# Patient Record
Sex: MALE | Race: WHITE
[De-identification: names, ages, dates, MRNs, and addresses within clinical notes are randomized per-mention and may not be internally consistent; named-entity substitution may affect disease eponyms.]

---

## 2019-09-03 ENCOUNTER — HOSPITAL ENCOUNTER (EMERGENCY)
Dept: HOSPITAL 74 - JER | Age: 29
LOS: 1 days | Discharge: HOME | End: 2019-09-04
Payer: SELF-PAY

## 2019-09-03 VITALS — BODY MASS INDEX: 26.2 KG/M2

## 2019-09-03 VITALS — TEMPERATURE: 98.8 F

## 2019-09-03 DIAGNOSIS — F17.210: ICD-10-CM

## 2019-09-03 DIAGNOSIS — R10.9: Primary | ICD-10-CM

## 2019-09-03 LAB
ALBUMIN SERPL-MCNC: 4.7 G/DL (ref 3.4–5)
ALP SERPL-CCNC: 80 U/L (ref 45–117)
ALT SERPL-CCNC: 35 U/L (ref 13–61)
ANION GAP SERPL CALC-SCNC: 7 MMOL/L (ref 8–16)
APPEARANCE UR: CLEAR
AST SERPL-CCNC: 21 U/L (ref 15–37)
BACTERIA # UR AUTO: 4.1 /HPF
BASOPHILS # BLD: 0.5 % (ref 0–2)
BILIRUB SERPL-MCNC: 0.6 MG/DL (ref 0.2–1)
BILIRUB UR STRIP.AUTO-MCNC: NEGATIVE MG/DL
BUN SERPL-MCNC: 8.6 MG/DL (ref 7–18)
CALCIUM SERPL-MCNC: 9.5 MG/DL (ref 8.5–10.1)
CASTS URNS QL MICRO: 41 /LPF (ref 0–8)
CHLORIDE SERPL-SCNC: 101 MMOL/L (ref 98–107)
CO2 SERPL-SCNC: 29 MMOL/L (ref 21–32)
COLOR UR: YELLOW
CREAT SERPL-MCNC: 0.9 MG/DL (ref 0.55–1.3)
DEPRECATED RDW RBC AUTO: 13.3 % (ref 11.9–15.9)
EOSINOPHIL # BLD: 0 % (ref 0–4.5)
EPITH CASTS URNS QL MICRO: 5.7 /HPF
GLUCOSE SERPL-MCNC: 134 MG/DL (ref 74–106)
HCT VFR BLD CALC: 51.5 % (ref 35.4–49)
HGB BLD-MCNC: 17.6 GM/DL (ref 11.7–16.9)
INR BLD: 1.01 (ref 0.83–1.09)
KETONES UR QL STRIP: (no result)
LEUKOCYTE ESTERASE UR QL STRIP.AUTO: (no result)
LIPASE SERPL-CCNC: 114 U/L (ref 73–393)
LYMPHOCYTES # BLD: 11.1 % (ref 8–40)
MAGNESIUM SERPL-MCNC: 2.3 MG/DL (ref 1.8–2.4)
MCH RBC QN AUTO: 31 PG (ref 25.7–33.7)
MCHC RBC AUTO-ENTMCNC: 34.3 G/DL (ref 32–35.9)
MCV RBC: 90.6 FL (ref 80–96)
MONOCYTES # BLD AUTO: 6 % (ref 3.8–10.2)
NEUTROPHILS # BLD: 82.4 % (ref 42.8–82.8)
NITRITE UR QL STRIP: NEGATIVE
PH UR: 6 [PH] (ref 5–8)
PHOSPHATE SERPL-MCNC: 2.6 MG/DL (ref 2.5–4.9)
PLATELET # BLD AUTO: 286 K/MM3 (ref 134–434)
PMV BLD: 9 FL (ref 7.5–11.1)
POTASSIUM SERPLBLD-SCNC: 3.7 MMOL/L (ref 3.5–5.1)
PROT SERPL-MCNC: 8.2 G/DL (ref 6.4–8.2)
PROT UR QL STRIP: (no result)
PROT UR QL STRIP: NEGATIVE
PT PNL PPP: 11.9 SEC (ref 9.7–13)
RBC # BLD AUTO: 3 /HPF (ref 0–4)
RBC # BLD AUTO: 5.68 M/MM3 (ref 4–5.6)
SODIUM SERPL-SCNC: 138 MMOL/L (ref 136–145)
SP GR UR: 1.03 (ref 1.01–1.03)
UROBILINOGEN UR STRIP-MCNC: 0.2 MG/DL (ref 0.2–1)
WBC # BLD AUTO: 16.4 K/MM3 (ref 4–10)
WBC # UR AUTO: 14 /HPF (ref 0–5)

## 2019-09-03 PROCEDURE — 3E033NZ INTRODUCTION OF ANALGESICS, HYPNOTICS, SEDATIVES INTO PERIPHERAL VEIN, PERCUTANEOUS APPROACH: ICD-10-PCS

## 2019-09-03 PROCEDURE — 3E0337Z INTRODUCTION OF ELECTROLYTIC AND WATER BALANCE SUBSTANCE INTO PERIPHERAL VEIN, PERCUTANEOUS APPROACH: ICD-10-PCS

## 2019-09-03 PROCEDURE — 3E033GC INTRODUCTION OF OTHER THERAPEUTIC SUBSTANCE INTO PERIPHERAL VEIN, PERCUTANEOUS APPROACH: ICD-10-PCS

## 2019-09-03 NOTE — PDOC
Rapid Medical Evaluation


Time Seen by Provider: 09/03/19 18:38


Medical Evaluation: 





09/03/19 18:38


I have performed a brief in-person evaluation of this patient.





The patient presents with a chief complaint of: 3 days of NBNB intractable 

vomiting with periumbilical abdominal pain, no diarrhea, no urinary symptoms, 

tactile fever, Unable to eat.





I have ordered the following: CBC, CMP, Type and screen, PT'PTT, UA, Ucx





The patient will proceed to the ED for further evaluation.





**Discharge Disposition





- Diagnosis


Abdominal pain


Qualifiers:


 Abdominal location: unspecified location Qualified Code(s): R10.9 - 

Unspecified abdominal pain








- Referrals





- Patient Instructions





- Post Discharge Activity

## 2019-09-03 NOTE — PDOC
Attending Attestation





- Resident


Resident Name: Lydia Simpson





- ED Attending Attestation


I have performed the following: I have examined & evaluated the patient, The 

case was reviewed & discussed with the resident, I agree w/resident's findings 

& plan, Exceptions are as noted





- HPI


HPI: 





09/03/19 23:41


30 yo male presents with nausea and vomiting and complained of diffuse 

abdominal pain





- Physicial Exam


PE: 





09/03/19 23:41


wnwd 30 yo male


head ncat


neck supple


lungs cta b/l


cvs [id number]


abd diffuse  tenderness to palpation,no rebound


skin warm and dry


ext no edema


neuro axox3,ambulatory





- Medical Decision Making





09/03/19 23:43


review of labs shows leukocytosis


c scan of abd/pel: no appendicitis,no sbo,no cholecystitis,no colitis





imp  gastritis


plan  if pt symptoms resolve,will d/c with zofran

## 2019-09-03 NOTE — PDOC
History of Present Illness





- General


Chief Complaint: Pain, Acute


Stated Complaint: ABDOMINAL PAIN


Time Seen by Provider: 09/03/19 18:38





Past History





- Past Medical History


Allergies/Adverse Reactions: 


 Allergies











Allergy/AdvReac Type Severity Reaction Status Date / Time


 


No Known Allergies Allergy   Verified 09/03/19 18:40











Home Medications: 


Ambulatory Orders





Ondansetron [Zofran Odt -] 4 mg SL TID #10 od.tablet 09/04/19 








COPD: No





- Suicide/Smoking/Psychosocial Hx


Smoking History: Current every day smoker


Number of Cigarettes Smoked Daily: 1


Information on smoking cessation initiated: No


Hx Alcohol Use: No


Drug/Substance Use Hx: Yes (CANNABIS)





*Physical Exam





- Vital Signs


 Last Vital Signs











Temp Pulse Resp BP Pulse Ox


 


 98.8 F   80   16   148/98   99 


 


 09/03/19 18:36  09/03/19 18:36  09/03/19 18:36  09/03/19 18:36  09/03/19 18:36














ED Treatment Course





- LABORATORY


CBC & Chemistry Diagram: 


 09/03/19 19:04





 09/03/19 19:04





- ADDITIONAL ORDERS


Additional order review: 


 Laboratory  Results











  09/03/19 09/03/19 09/03/19





  19:04 19:04 19:04


 


PT with INR    11.90


 


INR    1.01


 


Sodium   


 


Potassium   


 


Chloride   


 


Carbon Dioxide   


 


Anion Gap   


 


BUN   


 


Creatinine   


 


Est GFR (CKD-EPI)AfAm   


 


Est GFR (CKD-EPI)NonAf   


 


Random Glucose   


 


Calcium   


 


Phosphorus   


 


Magnesium   


 


Total Bilirubin   


 


AST   


 


ALT   


 


Alkaline Phosphatase   


 


Total Protein   


 


Albumin   


 


Lipase   


 


Urine Color  Yellow  


 


Urine Appearance  Clear  


 


Urine pH  6.0  


 


Ur Specific Gravity  1.026  


 


Urine Protein  1+ H  


 


Urine Glucose (UA)  Negative  


 


Urine Ketones  1+ H  


 


Urine Blood  Negative  


 


Urine Nitrite  Negative  


 


Urine Bilirubin  Negative  


 


Urine Urobilinogen  0.2  


 


Ur Leukocyte Esterase  Trace  


 


Urine WBC (Auto)  14  


 


Urine RBC (Auto)  3  


 


Urine Casts (Auto)  41  


 


U Pathogenic Cast Auto  None  


 


U Epithel Cells (Auto)  5.7  


 


U Sm Round Cell (Auto)  None  


 


Urine Bacteria (Auto)  4.1  


 


Blood Type   O POSITIVE 


 


Antibody Screen   Negative 














  09/03/19





  19:04


 


PT with INR 


 


INR 


 


Sodium  138


 


Potassium  3.7


 


Chloride  101


 


Carbon Dioxide  29


 


Anion Gap  7 L


 


BUN  8.6


 


Creatinine  0.9


 


Est GFR (CKD-EPI)AfAm  134.24


 


Est GFR (CKD-EPI)NonAf  115.82


 


Random Glucose  134 H


 


Calcium  9.5


 


Phosphorus  2.6


 


Magnesium  2.3


 


Total Bilirubin  0.6


 


AST  21


 


ALT  35


 


Alkaline Phosphatase  80


 


Total Protein  8.2


 


Albumin  4.7


 


Lipase  114


 


Urine Color 


 


Urine Appearance 


 


Urine pH 


 


Ur Specific Gravity 


 


Urine Protein 


 


Urine Glucose (UA) 


 


Urine Ketones 


 


Urine Blood 


 


Urine Nitrite 


 


Urine Bilirubin 


 


Urine Urobilinogen 


 


Ur Leukocyte Esterase 


 


Urine WBC (Auto) 


 


Urine RBC (Auto) 


 


Urine Casts (Auto) 


 


U Pathogenic Cast Auto 


 


U Epithel Cells (Auto) 


 


U Sm Round Cell (Auto) 


 


Urine Bacteria (Auto) 


 


Blood Type 


 


Antibody Screen 








 











  09/03/19





  19:04


 


RBC  5.68 H


 


MCV  90.6


 


MCHC  34.3


 


RDW  13.3


 


MPV  9.0


 


Neutrophils %  82.4


 


Lymphocytes %  11.1


 


Monocytes %  6.0


 


Eosinophils %  0.0


 


Basophils %  0.5














Medical Decision Making





- Medical Decision Making


HPI: 


28yo M with no significant PMH presenting with abdominal, nausea, and vomiting. 

Patient states he has felt this pain before; was evaluated two years ago and 

was found to have a urine infection. Since Saturday, patient has had 

periumbilical abdominal pain that is described as "emptiness." He reports that 

his pain worsens with eating. patient has been unable to tolerate po intake. He 

has had bilious vomiting 8-10 times today. Last bowel movement was a half hour 

before history-taking and was a normaly formed brown stool without blood. 

Denies urinary symptoms. No sick contacts or recent travel. Denies ETOH or 

tobacco use but has been a daily marijauna smoker, about one joint per day for 

the past five years. Reports subjective fevers, chills, headache, and weakness. 

No chest pain or shortness of breath. 





PCP: None





History obtained with assistance from [facility] : [id number]





ROS:


Constitutional: +fever, +chills


HEENT: no throat pain, no dysphagia


Cardiovascular: no chest pain, no palpitations


Respiratory: no cough, no shortness of breath


Gastrointestinal: +abdominal pain, +vomiting


Genitourinary: no dysuria, no hematuria


Musculoskeletal: no myalgia, no arthralgia


Skin: no rash, no itching


Neurologic: no headache, no weakness





PE: 


General: Awake, alert, and fully oriented, in no acute distress


Head: No signs of trauma


Eyes: EOMI, sclera anicteric


ENT: Dry mucus membranes


Neck: Normal ROM, supple


Lungs: Lungs clear, Normal breath sounds


Cardio: Regular rhythm, S1 and S2 present


Abdomen: Diffuse tenderness to palpation most focal to periumbilical region. No 

guarding, no rebound, no masses. No CVA tenderness


Extremities: Normal range of motion, Distal pulses present


SKIN: Warm, Dry, normal turgor


Neurologic: Cranial nerves II through XII grossly intact. Normal speech





ED Course/MDM: 


DDX including but not limited to appendicitis, gastritis, GERD, UTI, 

pyelonephritis, pancreatitis, cannabis hyperemesis


09/03/19 21:09





 CBC











WBC  16.4 K/mm3 (4.0-10.0)  H  09/03/19  19:04    


 


RBC  5.68 M/mm3 (4.00-5.60)  H  09/03/19  19:04    


 


Hgb  17.6 GM/dL (11.7-16.9)  H  09/03/19  19:04    


 


Hct  51.5 % (35.4-49)  H  09/03/19  19:04    


 


MCV  90.6 fl (80-96)   09/03/19  19:04    


 


MCH  31.0 pg (25.7-33.7)   09/03/19  19:04    


 


MCHC  34.3 g/dl (32.0-35.9)   09/03/19  19:04    


 


RDW  13.3 % (11.9-15.9)   09/03/19  19:04    


 


Plt Count  286 K/MM3 (134-434)   09/03/19  19:04    


 


MPV  9.0 fl (7.5-11.1)   09/03/19  19:04    


 


Absolute Neuts (auto)  13.5 K/mm3 (1.5-8.0)  H  09/03/19  19:04    


 


Neutrophils %  82.4 % (42.8-82.8)   09/03/19  19:04    


 


Lymphocytes %  11.1 % (8-40)   09/03/19  19:04    


 


Monocytes %  6.0 % (3.8-10.2)   09/03/19  19:04    


 


Eosinophils %  0.0 % (0-4.5)   09/03/19  19:04    


 


Basophils %  0.5 % (0-2.0)   09/03/19  19:04    


 


Nucleated RBC %  0 % (0-0)   09/03/19  19:04    








Leukocytosis, WBC= 16.4





 CMP











Sodium  138 mmol/L (136-145)   09/03/19  19:04    


 


Potassium  3.7 mmol/L (3.5-5.1)   09/03/19  19:04    


 


Chloride  101 mmol/L ()   09/03/19  19:04    


 


Carbon Dioxide  29 mmol/L (21-32)   09/03/19  19:04    


 


Anion Gap  7 MMOL/L (8-16)  L  09/03/19  19:04    


 


BUN  8.6 mg/dL (7-18)   09/03/19  19:04    


 


Creatinine  0.9 mg/dL (0.55-1.3)   09/03/19  19:04    


 


Est GFR (CKD-EPI)AfAm  134.24   09/03/19  19:04    


 


Est GFR (CKD-EPI)NonAf  115.82   09/03/19  19:04    


 


Random Glucose  134 mg/dL ()  H  09/03/19  19:04    


 


Calcium  9.5 mg/dL (8.5-10.1)   09/03/19  19:04    


 


Phosphorus  2.6 mg/dL (2.5-4.9)   09/03/19  19:04    


 


Magnesium  2.3 mg/dL (1.8-2.4)   09/03/19  19:04    


 


Total Bilirubin  0.6 mg/dL (0.2-1)   09/03/19  19:04    


 


AST  21 U/L (15-37)   09/03/19  19:04    


 


ALT  35 U/L (13-61)   09/03/19  19:04    


 


Alkaline Phosphatase  80 U/L ()   09/03/19  19:04    


 


Total Protein  8.2 g/dl (6.4-8.2)   09/03/19  19:04    


 


Albumin  4.7 g/dl (3.4-5.0)   09/03/19  19:04    


 


Lipase  114 U/L ()   09/03/19  19:04    








Electrolytes unremarkable


Cr normal


Lipase normal





CT neg for acute pathology, per radiology report. 


Tolerated po challenge of crackers and water


09/04/19 00:11





Sent prescription for zofran


Discharged with return precautions





CTAP with IV contrast, per radiology: "Sequential axial images were obtained 

from the domes of the diaphragms through the symphysis pubis following the 

administration of intravenous contrast material. The lung bases are clear of 

acute infiltrates or pleural effusions. Mild atelectatic changes are seen 

anteriorly. The liver, spleen, pancreas, adrenal glands and kidneys demonstrate 

no significant abnormalities. The gallbladder is clear. There is no evidence of 

intra-abdominal or retroperitoneal lymphadenopathy or fluid collections. There 

is no evidence of pneumoperitoneum, bowel obstruction or intra-abdominal 

abscess. There is no CT evidence of acute appendicitis or diverticulitis. 

Examination of the pelvis demonstrates no evidence of pelvic masses, fluid 

collections or lymphadenopathy. IMPRESSION: Essentially normal CT scan of the 

abdomen and pelvis with no evidence of acute pathology. "








*DC/Admit/Observation/Transfer


Diagnosis at time of Disposition: 


Abdominal pain


Qualifiers:


 Abdominal location: unspecified location Qualified Code(s): R10.9 - 

Unspecified abdominal pain








- Discharge Dispostion


Disposition: HOME


Condition at time of disposition: Improved





- Prescriptions


Prescriptions: 


Ondansetron [Zofran Odt -] 4 mg SL TID #10 od.tablet





- Referrals





- Patient Instructions


Printed Discharge Instructions:  DI for Abdominal Pain-Adult


Additional Instructions: 


You came into the emergency department for nausea, vomiting, and abdominal 

pain.  We gave you medicine which helped you feel better. Labs and CT scan did 

not indicate acute pathology. 





Antinausea prescription sent to your pharmacy. Take as instructed.





Follow-up with a primary care provider this week to discuss this ED visit and 

to further evaluate your symptoms. Your workup is not complete until you do so. 

You have been referred to the [facility] in case you do not have a 

primary care doctor. Call and make an appointment at the number provided. 





Immediate medical attention is required if you develop: severe abdominal pain, 

high fevers, persistent nausea, vomiting, or any new or concerning symptoms.  

If you think you are having an emergency, call for emergency medical services 

or present to the emergency department right away. 





===





Ingres al departamento de emergencias por nuseas, vmitos y dolor abdominal. 

Le dimos medicamentos que lo ayudaron a sentirse mejor. Los laboratorios y la 

tomografa computarizada no indicaron patologa aguda.





Receta antinausea enviada a concepcion farmacia. Tmelo segn las instrucciones.





Maxine un seguimiento con un proveedor de atencin primaria esta semana para 

analizar esta visita al servicio de urgencias y evaluar ms a fondo steve 

sntomas. Concepcion trabajo no est completo hasta que lo maxine. Lo patricia derivado a la 

[place] Sorensen en ankit de que no tenga un mdico de atencin primaria. 

Llame y maxine segudno anisha al nmero provisto.





Se requiere atencin mdica inmediata si desarrolla: dolor abdominal intenso, 

fiebre dolores, nuseas persistentes, vmitos o cualquier sntoma nuevo o 

preocupante. Si marco que tiene segundo emergencia, llame a los servicios mdicos de 

emergencia o presntese en el departamento de emergencias de inmediato.











- Post Discharge Activity


Forms/Work/School Notes:  Back to Work

## 2019-09-04 VITALS — DIASTOLIC BLOOD PRESSURE: 82 MMHG | SYSTOLIC BLOOD PRESSURE: 145 MMHG | HEART RATE: 86 BPM

## 2023-02-23 ENCOUNTER — HOSPITAL ENCOUNTER (EMERGENCY)
Dept: HOSPITAL 74 - JER | Age: 33
Discharge: HOME | End: 2023-02-23
Payer: SELF-PAY

## 2023-02-23 VITALS — TEMPERATURE: 98.8 F | SYSTOLIC BLOOD PRESSURE: 142 MMHG | DIASTOLIC BLOOD PRESSURE: 90 MMHG | HEART RATE: 65 BPM

## 2023-02-23 VITALS — BODY MASS INDEX: 29.4 KG/M2

## 2023-02-23 VITALS — RESPIRATION RATE: 18 BRPM

## 2023-02-23 DIAGNOSIS — R10.13: Primary | ICD-10-CM

## 2023-02-23 LAB
ALBUMIN SERPL-MCNC: 4.4 G/DL (ref 3.4–5)
ALP SERPL-CCNC: 79 U/L (ref 45–117)
ALT SERPL-CCNC: 63 U/L (ref 13–61)
ANION GAP SERPL CALC-SCNC: 10 MMOL/L (ref 8–16)
AST SERPL-CCNC: 40 U/L (ref 15–37)
BASOPHILS # BLD: 0.6 % (ref 0–2)
BILIRUB SERPL-MCNC: 0.7 MG/DL (ref 0.2–1)
BUN SERPL-MCNC: 8.5 MG/DL (ref 7–18)
CALCIUM SERPL-MCNC: 9 MG/DL (ref 8.5–10.1)
CHLORIDE SERPL-SCNC: 103 MMOL/L (ref 98–107)
CO2 SERPL-SCNC: 24 MMOL/L (ref 21–32)
CREAT SERPL-MCNC: 0.7 MG/DL (ref 0.55–1.3)
DEPRECATED RDW RBC AUTO: 13 % (ref 11.9–15.9)
EOSINOPHIL # BLD: 0.2 % (ref 0–4.5)
GLUCOSE SERPL-MCNC: 124 MG/DL (ref 74–106)
HCT VFR BLD CALC: 47.9 % (ref 35.4–49)
HGB BLD-MCNC: 16.8 GM/DL (ref 11.7–16.9)
LIPASE SERPL-CCNC: 77 U/L (ref 73–393)
LYMPHOCYTES # BLD: 14.3 % (ref 8–40)
MCH RBC QN AUTO: 31.6 PG (ref 25.7–33.7)
MCHC RBC AUTO-ENTMCNC: 35 G/DL (ref 32–35.9)
MCV RBC: 90.2 FL (ref 80–96)
MONOCYTES # BLD AUTO: 5.3 % (ref 3.8–10.2)
NEUTROPHILS # BLD: 79.6 % (ref 42.8–82.8)
PLATELET # BLD AUTO: 287 10^3/UL (ref 134–434)
PMV BLD: 8.7 FL (ref 7.5–11.1)
PROT SERPL-MCNC: 7.6 G/DL (ref 6.4–8.2)
RBC # BLD AUTO: 5.31 M/MM3 (ref 4–5.6)
SODIUM SERPL-SCNC: 137 MMOL/L (ref 136–145)
WBC # BLD AUTO: 13.8 K/MM3 (ref 4–10)

## 2023-02-23 PROCEDURE — 3E033GC INTRODUCTION OF OTHER THERAPEUTIC SUBSTANCE INTO PERIPHERAL VEIN, PERCUTANEOUS APPROACH: ICD-10-PCS

## 2023-02-24 ENCOUNTER — HOSPITAL ENCOUNTER (EMERGENCY)
Dept: HOSPITAL 74 - JER | Age: 33
Discharge: HOME | End: 2023-02-24
Payer: SELF-PAY

## 2023-02-24 VITALS
SYSTOLIC BLOOD PRESSURE: 141 MMHG | RESPIRATION RATE: 18 BRPM | DIASTOLIC BLOOD PRESSURE: 75 MMHG | TEMPERATURE: 98.3 F | HEART RATE: 73 BPM

## 2023-02-24 VITALS — BODY MASS INDEX: 26.9 KG/M2

## 2023-02-24 DIAGNOSIS — R11.2: ICD-10-CM

## 2023-02-24 DIAGNOSIS — R10.13: Primary | ICD-10-CM

## 2023-02-24 LAB
ALBUMIN SERPL-MCNC: 4.1 G/DL (ref 3.4–5)
ALP SERPL-CCNC: 79 U/L (ref 45–117)
ALT SERPL-CCNC: 82 U/L (ref 13–61)
ANION GAP SERPL CALC-SCNC: 3 MMOL/L (ref 8–16)
APTT BLD: 31.2 SECONDS (ref 25.2–36.5)
AST SERPL-CCNC: 39 U/L (ref 15–37)
BASOPHILS # BLD: 0.6 % (ref 0–2)
BILIRUB SERPL-MCNC: 0.6 MG/DL (ref 0.2–1)
BUN SERPL-MCNC: 8.4 MG/DL (ref 7–18)
CALCIUM SERPL-MCNC: 8.7 MG/DL (ref 8.5–10.1)
CHLORIDE SERPL-SCNC: 105 MMOL/L (ref 98–107)
CO2 SERPL-SCNC: 27 MMOL/L (ref 21–32)
CREAT SERPL-MCNC: 0.7 MG/DL (ref 0.55–1.3)
DEPRECATED RDW RBC AUTO: 13.3 % (ref 11.9–15.9)
EOSINOPHIL # BLD: 0.1 % (ref 0–4.5)
GLUCOSE SERPL-MCNC: 109 MG/DL (ref 74–106)
HCT VFR BLD CALC: 46.1 % (ref 35.4–49)
HGB BLD-MCNC: 16.1 GM/DL (ref 11.7–16.9)
INR BLD: 1.01 (ref 0.83–1.09)
LIPASE SERPL-CCNC: 68 U/L (ref 73–393)
LYMPHOCYTES # BLD: 15.7 % (ref 8–40)
MCH RBC QN AUTO: 31.4 PG (ref 25.7–33.7)
MCHC RBC AUTO-ENTMCNC: 34.9 G/DL (ref 32–35.9)
MCV RBC: 90 FL (ref 80–96)
MONOCYTES # BLD AUTO: 5 % (ref 3.8–10.2)
NEUTROPHILS # BLD: 78.6 % (ref 42.8–82.8)
PLATELET # BLD AUTO: 294 10^3/UL (ref 134–434)
PMV BLD: 9.3 FL (ref 7.5–11.1)
PROT SERPL-MCNC: 7.2 G/DL (ref 6.4–8.2)
PT PNL PPP: 11.7 SEC (ref 9.7–13)
RBC # BLD AUTO: 5.13 M/MM3 (ref 4–5.6)
SODIUM SERPL-SCNC: 135 MMOL/L (ref 136–145)
WBC # BLD AUTO: 11.4 K/MM3 (ref 4–10)

## 2023-02-24 PROCEDURE — 3E033GC INTRODUCTION OF OTHER THERAPEUTIC SUBSTANCE INTO PERIPHERAL VEIN, PERCUTANEOUS APPROACH: ICD-10-PCS

## 2023-02-24 PROCEDURE — 3E023GC INTRODUCTION OF OTHER THERAPEUTIC SUBSTANCE INTO MUSCLE, PERCUTANEOUS APPROACH: ICD-10-PCS
